# Patient Record
Sex: FEMALE | Race: WHITE | NOT HISPANIC OR LATINO | URBAN - METROPOLITAN AREA
[De-identification: names, ages, dates, MRNs, and addresses within clinical notes are randomized per-mention and may not be internally consistent; named-entity substitution may affect disease eponyms.]

---

## 2018-11-01 ENCOUNTER — OFFICE VISIT - RIVER FALLS (OUTPATIENT)
Dept: FAMILY MEDICINE | Facility: CLINIC | Age: 58
End: 2018-11-01

## 2018-11-01 ENCOUNTER — COMMUNICATION - RIVER FALLS (OUTPATIENT)
Dept: FAMILY MEDICINE | Facility: CLINIC | Age: 58
End: 2018-11-01

## 2022-02-11 VITALS
TEMPERATURE: 98.5 F | BODY MASS INDEX: 32.02 KG/M2 | DIASTOLIC BLOOD PRESSURE: 66 MMHG | WEIGHT: 188 LBS | OXYGEN SATURATION: 96 % | HEART RATE: 83 BPM | SYSTOLIC BLOOD PRESSURE: 116 MMHG

## 2022-02-16 NOTE — PROGRESS NOTES
Patient:   LENCHO ECHEVARRIA            MRN: [id number]            FIN: [id number]               Age:   58 years     Sex:  Female     :  1960   Associated Diagnoses:   Sore throat   Author:   Robbin Jane MD      Visit Information      Date of Service: 2018 06:00 pm  Performing Location: [facility]  Encounter#: [id number]      Primary Care Provider (PCP):  97 -UNKNOWN,      Referring Provider:  Robbin Jane MD    NPI# [id number]      Chief Complaint   2018 6:02 PM CDT    Sore throat, bilateral ear pain and headache x 3 day              Additional Information:No additional information recorded during visit.   Chief complaint and symptoms as noted above and confirmed with patient.  Recent lab and diagnostic studies reviewed with patient      History of Present Illness   2018: Presents with 3-4-day history of upper respiratory symptoms primarily described as malaise, pharyngitis, fatigue.  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  Frequent pediatric illnesses is her norm.  Describes a history of recurrent strep infections throughout her lifetime.  Primary concern is whether current symptoms may be related to strep pharyngitis.         Review of Systems   Constitutional:  Fatigue, No fever, No chills.    Eye:  Negative except as documented in history of present illness.    Ear/Nose/Mouth/Throat:  Nasal congestion, Sore throat.    Respiratory:  No shortness of breath, No cough, No wheezing.    Cardiovascular:  No chest pain, No palpitations, No peripheral edema, No syncope.    Gastrointestinal:  No nausea, No vomiting, No abdominal pain.    Genitourinary:  No dysuria, No hematuria.    Hematology/Lymphatics:  Negative except as documented in history of present illness.    Endocrine   Immunologic:  No recurrent fevers.    Musculoskeletal   Neurologic:  Alert and oriented X4.       Health Status   Allergies:    Allergic Reactions (Selected)  No Known Medication Allergies   Medications:  (Selected)    Prescriptions  Prescribed  Accu check smart view test strips: Accu check smart view test strips, See Instructions, Instructions: Test 2x/ day, Supply, # 200 EA, 2 Refill(s), Type: Maintenance, Pharmacy: [id number], Test 2x/ day  ProAir HFA 90 mcg/inh inhalation aerosol: 2 puff(s), inh, qid, # 1 EA, 2 Refill(s), Type: Maintenance, Pharmacy: [id number], 2 puff(s) inh qid  accu check fast clix lancets: accu check fast clix lancets, See Instructions, Instructions: testing BID, Supply, # 1 box(es), 1 Refill(s), Type: Maintenance, Pharmacy: [id number], testing BID  atorvastatin 20 mg oral tablet: 1 tab(s) ( 20 mg ), po, daily, # 30 tab(s), 0 Refill(s), Type: Maintenance, Pharmacy: [id number], pt due for px in June and to establish care w/ new HCP., 1 tab(s) po daily  lisinopril 10 mg oral tablet: 1 tab(s) ( 10 mg ), po, daily, # 30 tab(s), 0 Refill(s), Type: Maintenance, Pharmacy: [id number], 1 tab(s) po daily  metFORMIN 500 mg oral tablet: 2 tab(s) ( 1,000 mg ), po, bid, # 360 tab(s), 1 Refill(s), Type: Maintenance, Pharmacy: [id number], 2 tab(s) po bid,x90 day(s)  omeprazole 20 mg oral delayed release capsule: 1 cap(s) ( 20 mg ), po, daily, # 30 cap(s), 0 Refill(s), Type: Maintenance, Pharmacy: [id number], 1 cap(s) po daily  Documented Medications  Documented  ibuprofen 200 mg oral tablet: 1 tab(s) ( 200 mg ), po, prn, 0 Refill(s), Type: Maintenance   Problem list:    All Problems  Arthritis / ICD-9-.90 / Confirmed  Carpal tunnel syndrome, bilateral / ICD-9-.0 / Confirmed  Exercise-Induced Asthma / ICD-9-.81 / Confirmed  History of chicken pox / SNOMED CT 333413954 / Confirmed  Hyperlipemia / SNOMED CT 05506315 / Confirmed  Hypertension / SNOMED CT 2206906298 / Confirmed  Insomnia / SNOMED CT 999110635 / Confirmed  Menarche / ICD-9-CM V21.8 / Confirmed  Obesity / ICD-9-.00 / Probable  Prediabetes / SNOMED CT  [id number] / Confirmed  Seasonal allergies / SNOMED CT 906126377 / Confirmed  Resolved: Calcium oxalate crystals / SNOMED CT 121951666  Resolved: Pregnancy / SNOMED CT 289394320  Resolved: Pregnancy / SNOMED CT 750461437  Resolved: Pregnancy / SNOMED CT 505650101      Histories   Past Medical History:    Active  Menarche (V21.8): Onset in  at 13 years.  Hypertension (4161150624)  Insomnia (712291940)  Exercise-Induced Asthma (493.81)  Arthritis (716.90)  Prediabetes ([id number])  Seasonal allergies (075164520)  History of chicken pox (747441078)  Resolved  Calcium oxalate crystals (402930359):  Resolved.  Pregnancy (701590833):  Resolved on 1979 at 19 years.  Pregnancy (049527629):  Resolved on 1991 at 31 years.  Pregnancy (569792072):  Resolved on 10/18/1999 at 39 years.   Family History:    Emphysema  Mother  Diabetes mellitus  Father ()  Sister  Grandmother (P)  Hypertension  Grandmother (M)  Pancreatic cancer  Father ()  Arthritis  Mother  Tobacco user  Mother     Procedure history:    Esophagogastroduodenoscopy (697964913) on 2014 at 54 Years.  Comments:  2014 9:37 AM - Angeles Cherry RN  Sedation: fentanyl, midazolam, cetacaine  Indication: dysphagia  Distal & Mid esophagus biopsy: esophageal squamous mucosa with no diagnostic abnormalities, no reflux changes or evidence of eosinophilic esophagitis  Clotest negative  Colonoscopy (533734201) on 2011 at 51 Years.  Comments:  2011 8:05 AM - Kaden Vegas MD  normal, repeat 10 years   section (06934637) in  at 39 Years.  Tubal ligation (586917824) in  at 39 Years.  DEXA repeat due ..  NVD (normal vaginal delivery) ([id number]).  Comments:  2012 9:53 AM - Kiersten Donnelly  x2; G3, P3.  Coronary calcium score at 92 %.   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      Home and Environment Assessment            Marital status: .  Spouse/Partner name: [place]/John.      Exercise and Physical Activity Assessment                     Comments:                      06/22/2016 - Jordan Marisa                     No regular exercise.      Sexual Assessment            Sexually active: No.  Sexual orientation: Heterosexual.        Physical Examination   vital signs stable, as noted above   Vital Signs   11/1/2018 6:02 PM CDT Temperature Tympanic 98.5 DegF    Peripheral Pulse Rate 83 bpm    Systolic Blood Pressure 116 mmHg    Diastolic Blood Pressure 66 mmHg    Mean Arterial Pressure 83 mmHg    BP Site Right arm    BP Method Manual    Oxygen Saturation 96 %      Measurements from flowsheet : Measurements   11/1/2018 6:02 PM CDT Height/Length Estimated 64.25 in    Weight Measured - Standard 188 lb      General:  Alert and oriented, No acute distress.    Eye:  Extraocular movements are intact.    HENT:  Normocephalic, Tympanic membranes are clear, Oral mucosa is moist, No pharyngeal erythema.    Neck:  Supple, anterior cervical lymphadenitis; no palpable LAD.    Respiratory:  Lungs are clear to auscultation, Respirations are non-labored.    Cardiovascular:  Normal rate, Regular rhythm, No murmur, No edema.    Gastrointestinal:  Soft, Non-tender.    Neurologic:  Alert, Oriented.    Cognition and Speech:  Oriented, Speech clear and coherent.    Psychiatric:  Appropriate mood & affect.       Review / Management   Results review:  Lab results: 11/1/2018 6:20 PM CDT    Group A Strep POC         NOT DETECTED  .       Impression and Plan   Diagnosis     Sore throat (VUW32-QC J02.9).     - rapid strep  negative; processing throat culture  - likely viral in origin - high risk exposure given home   - advised NSAIDs/APAP, cepacol lozenges, salt water rinses, hot tea with honey